# Patient Record
Sex: FEMALE | URBAN - METROPOLITAN AREA
[De-identification: names, ages, dates, MRNs, and addresses within clinical notes are randomized per-mention and may not be internally consistent; named-entity substitution may affect disease eponyms.]

---

## 2023-02-23 LAB
ABO (EXTERNAL): NORMAL
RH (EXTERNAL): POSITIVE

## 2023-11-10 LAB
HEPATITIS B SURFACE ANTIGEN (EXTERNAL): NEGATIVE
HIV1+2 AB SERPL QL IA: NEGATIVE
RUBELLA ANTIBODY IGG (EXTERNAL): NORMAL
TREPONEMA PALLIDUM ANTIBODY (EXTERNAL): NONREACTIVE

## 2024-04-26 LAB — GROUP B STREPTOCOCCUS (EXTERNAL): NEGATIVE

## 2024-05-16 ENCOUNTER — HOSPITAL ENCOUNTER (INPATIENT)
Facility: CLINIC | Age: 34
LOS: 1 days | Discharge: HOME HEALTH | End: 2024-05-17
Attending: OBSTETRICS & GYNECOLOGY | Admitting: OBSTETRICS & GYNECOLOGY
Payer: COMMERCIAL

## 2024-05-16 PROBLEM — Z36.89 ENCOUNTER FOR TRIAGE IN PREGNANT PATIENT: Status: ACTIVE | Noted: 2024-05-16

## 2024-05-16 LAB
ABO/RH(D): NORMAL
ANTIBODY SCREEN: NEGATIVE
HGB BLD-MCNC: 12.2 G/DL (ref 11.7–15.7)
SPECIMEN EXPIRATION DATE: NORMAL
T PALLIDUM AB SER QL: NONREACTIVE

## 2024-05-16 PROCEDURE — 250N000013 HC RX MED GY IP 250 OP 250 PS 637: Performed by: OBSTETRICS & GYNECOLOGY

## 2024-05-16 PROCEDURE — 250N000009 HC RX 250: Performed by: OBSTETRICS & GYNECOLOGY

## 2024-05-16 PROCEDURE — 86780 TREPONEMA PALLIDUM: CPT | Performed by: OBSTETRICS & GYNECOLOGY

## 2024-05-16 PROCEDURE — 120N000012 HC R&B POSTPARTUM

## 2024-05-16 PROCEDURE — 722N000001 HC LABOR CARE VAGINAL DELIVERY SINGLE

## 2024-05-16 PROCEDURE — 85018 HEMOGLOBIN: CPT | Performed by: OBSTETRICS & GYNECOLOGY

## 2024-05-16 PROCEDURE — G0463 HOSPITAL OUTPT CLINIC VISIT: HCPCS

## 2024-05-16 PROCEDURE — 250N000011 HC RX IP 250 OP 636: Performed by: OBSTETRICS & GYNECOLOGY

## 2024-05-16 PROCEDURE — 86900 BLOOD TYPING SEROLOGIC ABO: CPT | Performed by: OBSTETRICS & GYNECOLOGY

## 2024-05-16 RX ORDER — OXYTOCIN/0.9 % SODIUM CHLORIDE 30/500 ML
340 PLASTIC BAG, INJECTION (ML) INTRAVENOUS CONTINUOUS PRN
Status: DISCONTINUED | OUTPATIENT
Start: 2024-05-16 | End: 2024-05-17 | Stop reason: HOSPADM

## 2024-05-16 RX ORDER — DOCUSATE SODIUM 100 MG/1
100 CAPSULE, LIQUID FILLED ORAL 2 TIMES DAILY
Status: DISCONTINUED | OUTPATIENT
Start: 2024-05-16 | End: 2024-05-17 | Stop reason: HOSPADM

## 2024-05-16 RX ORDER — METHYLERGONOVINE MALEATE 0.2 MG/ML
200 INJECTION INTRAVENOUS
Status: DISCONTINUED | OUTPATIENT
Start: 2024-05-16 | End: 2024-05-17 | Stop reason: HOSPADM

## 2024-05-16 RX ORDER — OXYTOCIN 10 [USP'U]/ML
10 INJECTION, SOLUTION INTRAMUSCULAR; INTRAVENOUS
Status: DISCONTINUED | OUTPATIENT
Start: 2024-05-16 | End: 2024-05-16

## 2024-05-16 RX ORDER — IBUPROFEN 400 MG/1
800 TABLET, FILM COATED ORAL
Status: COMPLETED | OUTPATIENT
Start: 2024-05-16 | End: 2024-05-16

## 2024-05-16 RX ORDER — MISOPROSTOL 200 UG/1
400 TABLET ORAL
Status: DISCONTINUED | OUTPATIENT
Start: 2024-05-16 | End: 2024-05-17 | Stop reason: HOSPADM

## 2024-05-16 RX ORDER — ONDANSETRON 4 MG/1
4 TABLET, ORALLY DISINTEGRATING ORAL EVERY 6 HOURS PRN
Status: DISCONTINUED | OUTPATIENT
Start: 2024-05-16 | End: 2024-05-16 | Stop reason: HOSPADM

## 2024-05-16 RX ORDER — TRANEXAMIC ACID 10 MG/ML
1 INJECTION, SOLUTION INTRAVENOUS EVERY 30 MIN PRN
Status: DISCONTINUED | OUTPATIENT
Start: 2024-05-16 | End: 2024-05-17 | Stop reason: HOSPADM

## 2024-05-16 RX ORDER — MISOPROSTOL 200 UG/1
400 TABLET ORAL
Status: DISCONTINUED | OUTPATIENT
Start: 2024-05-16 | End: 2024-05-16 | Stop reason: HOSPADM

## 2024-05-16 RX ORDER — OXYTOCIN 10 [USP'U]/ML
10 INJECTION, SOLUTION INTRAMUSCULAR; INTRAVENOUS
Status: DISCONTINUED | OUTPATIENT
Start: 2024-05-16 | End: 2024-05-17 | Stop reason: HOSPADM

## 2024-05-16 RX ORDER — OXYTOCIN/0.9 % SODIUM CHLORIDE 30/500 ML
100-340 PLASTIC BAG, INJECTION (ML) INTRAVENOUS CONTINUOUS PRN
Status: DISCONTINUED | OUTPATIENT
Start: 2024-05-16 | End: 2024-05-16

## 2024-05-16 RX ORDER — NALOXONE HYDROCHLORIDE 0.4 MG/ML
0.2 INJECTION, SOLUTION INTRAMUSCULAR; INTRAVENOUS; SUBCUTANEOUS
Status: DISCONTINUED | OUTPATIENT
Start: 2024-05-16 | End: 2024-05-16 | Stop reason: HOSPADM

## 2024-05-16 RX ORDER — OXYTOCIN/0.9 % SODIUM CHLORIDE 30/500 ML
340 PLASTIC BAG, INJECTION (ML) INTRAVENOUS CONTINUOUS PRN
Status: DISCONTINUED | OUTPATIENT
Start: 2024-05-16 | End: 2024-05-16 | Stop reason: HOSPADM

## 2024-05-16 RX ORDER — LOPERAMIDE HCL 2 MG
2 CAPSULE ORAL
Status: DISCONTINUED | OUTPATIENT
Start: 2024-05-16 | End: 2024-05-17 | Stop reason: HOSPADM

## 2024-05-16 RX ORDER — MISOPROSTOL 200 UG/1
800 TABLET ORAL
Status: DISCONTINUED | OUTPATIENT
Start: 2024-05-16 | End: 2024-05-17 | Stop reason: HOSPADM

## 2024-05-16 RX ORDER — METOCLOPRAMIDE HYDROCHLORIDE 5 MG/ML
10 INJECTION INTRAMUSCULAR; INTRAVENOUS EVERY 6 HOURS PRN
Status: DISCONTINUED | OUTPATIENT
Start: 2024-05-16 | End: 2024-05-16 | Stop reason: HOSPADM

## 2024-05-16 RX ORDER — CARBOPROST TROMETHAMINE 250 UG/ML
250 INJECTION, SOLUTION INTRAMUSCULAR
Status: DISCONTINUED | OUTPATIENT
Start: 2024-05-16 | End: 2024-05-16 | Stop reason: HOSPADM

## 2024-05-16 RX ORDER — BISACODYL 10 MG
10 SUPPOSITORY, RECTAL RECTAL DAILY PRN
Status: DISCONTINUED | OUTPATIENT
Start: 2024-05-16 | End: 2024-05-17 | Stop reason: HOSPADM

## 2024-05-16 RX ORDER — METOCLOPRAMIDE 10 MG/1
10 TABLET ORAL EVERY 6 HOURS PRN
Status: DISCONTINUED | OUTPATIENT
Start: 2024-05-16 | End: 2024-05-16 | Stop reason: HOSPADM

## 2024-05-16 RX ORDER — LOPERAMIDE HCL 2 MG
2 CAPSULE ORAL
Status: DISCONTINUED | OUTPATIENT
Start: 2024-05-16 | End: 2024-05-16 | Stop reason: HOSPADM

## 2024-05-16 RX ORDER — KETOROLAC TROMETHAMINE 30 MG/ML
30 INJECTION, SOLUTION INTRAMUSCULAR; INTRAVENOUS
Status: COMPLETED | OUTPATIENT
Start: 2024-05-16 | End: 2024-05-16

## 2024-05-16 RX ORDER — PROCHLORPERAZINE MALEATE 5 MG
10 TABLET ORAL EVERY 6 HOURS PRN
Status: DISCONTINUED | OUTPATIENT
Start: 2024-05-16 | End: 2024-05-16 | Stop reason: HOSPADM

## 2024-05-16 RX ORDER — LIDOCAINE 40 MG/G
CREAM TOPICAL
Status: DISCONTINUED | OUTPATIENT
Start: 2024-05-16 | End: 2024-05-16 | Stop reason: HOSPADM

## 2024-05-16 RX ORDER — LOPERAMIDE HCL 2 MG
4 CAPSULE ORAL
Status: DISCONTINUED | OUTPATIENT
Start: 2024-05-16 | End: 2024-05-17 | Stop reason: HOSPADM

## 2024-05-16 RX ORDER — MODIFIED LANOLIN
OINTMENT (GRAM) TOPICAL
Status: DISCONTINUED | OUTPATIENT
Start: 2024-05-16 | End: 2024-05-17 | Stop reason: HOSPADM

## 2024-05-16 RX ORDER — HYDROCORTISONE 25 MG/G
CREAM TOPICAL 3 TIMES DAILY PRN
Status: DISCONTINUED | OUTPATIENT
Start: 2024-05-16 | End: 2024-05-17 | Stop reason: HOSPADM

## 2024-05-16 RX ORDER — PROCHLORPERAZINE 25 MG
25 SUPPOSITORY, RECTAL RECTAL EVERY 12 HOURS PRN
Status: DISCONTINUED | OUTPATIENT
Start: 2024-05-16 | End: 2024-05-16 | Stop reason: HOSPADM

## 2024-05-16 RX ORDER — MISOPROSTOL 200 UG/1
800 TABLET ORAL
Status: DISCONTINUED | OUTPATIENT
Start: 2024-05-16 | End: 2024-05-16 | Stop reason: HOSPADM

## 2024-05-16 RX ORDER — CITRIC ACID/SODIUM CITRATE 334-500MG
30 SOLUTION, ORAL ORAL
Status: DISCONTINUED | OUTPATIENT
Start: 2024-05-16 | End: 2024-05-16 | Stop reason: HOSPADM

## 2024-05-16 RX ORDER — NALOXONE HYDROCHLORIDE 0.4 MG/ML
0.4 INJECTION, SOLUTION INTRAMUSCULAR; INTRAVENOUS; SUBCUTANEOUS
Status: DISCONTINUED | OUTPATIENT
Start: 2024-05-16 | End: 2024-05-16 | Stop reason: HOSPADM

## 2024-05-16 RX ORDER — FENTANYL CITRATE 50 UG/ML
100 INJECTION, SOLUTION INTRAMUSCULAR; INTRAVENOUS
Status: DISCONTINUED | OUTPATIENT
Start: 2024-05-16 | End: 2024-05-16 | Stop reason: HOSPADM

## 2024-05-16 RX ORDER — IBUPROFEN 400 MG/1
800 TABLET, FILM COATED ORAL EVERY 6 HOURS PRN
Status: DISCONTINUED | OUTPATIENT
Start: 2024-05-16 | End: 2024-05-17 | Stop reason: HOSPADM

## 2024-05-16 RX ORDER — TRANEXAMIC ACID 10 MG/ML
1 INJECTION, SOLUTION INTRAVENOUS EVERY 30 MIN PRN
Status: DISCONTINUED | OUTPATIENT
Start: 2024-05-16 | End: 2024-05-16 | Stop reason: HOSPADM

## 2024-05-16 RX ORDER — METHYLERGONOVINE MALEATE 0.2 MG/ML
200 INJECTION INTRAVENOUS
Status: DISCONTINUED | OUTPATIENT
Start: 2024-05-16 | End: 2024-05-16 | Stop reason: HOSPADM

## 2024-05-16 RX ORDER — CARBOPROST TROMETHAMINE 250 UG/ML
250 INJECTION, SOLUTION INTRAMUSCULAR
Status: DISCONTINUED | OUTPATIENT
Start: 2024-05-16 | End: 2024-05-17 | Stop reason: HOSPADM

## 2024-05-16 RX ORDER — ONDANSETRON 2 MG/ML
4 INJECTION INTRAMUSCULAR; INTRAVENOUS EVERY 6 HOURS PRN
Status: DISCONTINUED | OUTPATIENT
Start: 2024-05-16 | End: 2024-05-16 | Stop reason: HOSPADM

## 2024-05-16 RX ORDER — FERROUS SULFATE 325(65) MG
325 TABLET ORAL
COMMUNITY

## 2024-05-16 RX ORDER — ASCORBIC ACID 100 MG
1 TABLET,CHEWABLE ORAL DAILY
COMMUNITY

## 2024-05-16 RX ORDER — OXYTOCIN 10 [USP'U]/ML
10 INJECTION, SOLUTION INTRAMUSCULAR; INTRAVENOUS
Status: DISCONTINUED | OUTPATIENT
Start: 2024-05-16 | End: 2024-05-16 | Stop reason: HOSPADM

## 2024-05-16 RX ORDER — ACETAMINOPHEN 325 MG/1
650 TABLET ORAL EVERY 4 HOURS PRN
Status: DISCONTINUED | OUTPATIENT
Start: 2024-05-16 | End: 2024-05-17 | Stop reason: HOSPADM

## 2024-05-16 RX ORDER — LOPERAMIDE HCL 2 MG
4 CAPSULE ORAL
Status: DISCONTINUED | OUTPATIENT
Start: 2024-05-16 | End: 2024-05-16 | Stop reason: HOSPADM

## 2024-05-16 RX ADMIN — METHYLERGONOVINE MALEATE 200 MCG: 0.2 INJECTION INTRAVENOUS at 04:34

## 2024-05-16 RX ADMIN — IBUPROFEN 800 MG: 400 TABLET ORAL at 08:50

## 2024-05-16 RX ADMIN — DOCUSATE SODIUM 100 MG: 100 CAPSULE, LIQUID FILLED ORAL at 20:50

## 2024-05-16 RX ADMIN — Medication 340 ML/HR: at 02:14

## 2024-05-16 RX ADMIN — IBUPROFEN 800 MG: 400 TABLET ORAL at 02:39

## 2024-05-16 RX ADMIN — ACETAMINOPHEN 650 MG: 325 TABLET, FILM COATED ORAL at 02:39

## 2024-05-16 ASSESSMENT — ACTIVITIES OF DAILY LIVING (ADL)
ADLS_ACUITY_SCORE: 18
ADLS_ACUITY_SCORE: 18
ADLS_ACUITY_SCORE: 23
ADLS_ACUITY_SCORE: 18
ADLS_ACUITY_SCORE: 22
ADLS_ACUITY_SCORE: 18
ADLS_ACUITY_SCORE: 22
ADLS_ACUITY_SCORE: 23
ADLS_ACUITY_SCORE: 22
ADLS_ACUITY_SCORE: 18
ADLS_ACUITY_SCORE: 22
ADLS_ACUITY_SCORE: 23
ADLS_ACUITY_SCORE: 22
ADLS_ACUITY_SCORE: 22
ADLS_ACUITY_SCORE: 18

## 2024-05-16 NOTE — PROGRESS NOTES
Noa is a  39+3 presenting with  Ian for evaluation of contractions. She reports contractions starting around 2300, increasing in intensity and frequency. She denies LOF and VB, endorses +FM. She reports an urge to push shortly after admission into triage room, LINDA LOUIE 10/100/-2. Pt transferred via bed to room 218, report to Yanet PARK.

## 2024-05-16 NOTE — PROGRESS NOTES
[facility]   Obstetrics Progress Note    Subjective:   This is the patient's day 0 day since Vaginal delivery. She is doing well.  She is ambulating and urinating on her own. Pain is well controlled with medication. Breastfeeding is going well. Lochia is within normal limits.     Objective:   Vitals were reviewed  Temp: 98  F (36.7  C) Temp src: Oral BP: 109/72 Pulse: 76   Resp: 18   O2 Device: None (Room air)      Constitutional: healthy, alert, no distress.   Abdomen: Abdomen soft, non-tender. BS normal. No masses, fundus is firm.  JOINT/EXTREMITIES: extremities normal     Last hemoglobin was   Hemoglobin   Date Value Ref Range Status   05/16/2024 12.2 11.7 - 15.7 g/dL Final   ]    Assessment:   Stable postpartum course.    Plan:   Routine cares. Ambulation encouraged  Monitor wound for signs of infection  Pain control measures as needed  Anticipate discharge tomorrow  Pain control: ibuprofen and acetaminophen PRN  Diet as tolerated  Activity as tolerated  Dispo: anticipate discharge tomorrow.       JENNY Carcamo CNP

## 2024-05-16 NOTE — PLAN OF CARE
Data: Noa Doyle transferred to [facility] via wheelchair at 0530. Baby transferred via parent's arms.  Action: Receiving unit notified of transfer: Yes. Patient and family notified of room change. Report given to Violet Morgan RN at 0540. Belongings sent to receiving unit. Accompanied by Registered Nurse. Oriented patient to surroundings. Call light within reach. ID bands double-checked with receiving RN.  Response: Patient tolerated transfer and is stable.Goal      Fundus firm, U/1, with scant rubra at time of transfer.

## 2024-05-16 NOTE — H&P
Labor and Delivery Admission Note  Chief Complaint:   Chief Complaint   Patient presents with    Contractions      History of Present Illness: Noa Doyle is a 33 year old  at 39w3d who presents with painful contractions.    Vital Signs: /81   Temp 97.2  F (36.2  C) (Temporal)   Resp 20    Physical Exam: General: NAD, mood appropriate  Cardiovascular: Regular rate  Pulmonary: Non-labored  Abdomen: Gravid, non-tender  Extremities: Warm and well perfused  Fetal Presentation: Vertex  Cervical Exam:  complete and intact  Fetal Assessment: moderate variablility, + accels, no decels, Category I    Assessment and Plan:  Noa Doyle is a 33 year old  being admitted to L&D for SOL  - AROM and anticipate     JANEL MELGAR MD  2024, 2:22 AM

## 2024-05-16 NOTE — PROVIDER NOTIFICATION
05/16/24 0427   Provider Notification   Provider Name/Title Dr Schmidt   Method of Notification Electronic Page     Please call Yanet RN at [phone] regarding Noa A Room 218. Thank you

## 2024-05-16 NOTE — PROGRESS NOTES
Patient, spouse, and  transferred to room 427 accompanied by Yanet Pool RN. Bedside report received at this time. ID bands double verified. Patient and spouse oriented to room, call light, and plan of care. Safety protocols including safe sleep and bulb syringe use reviewed. Encouraged to call with questions/concerns.

## 2024-05-16 NOTE — PROGRESS NOTES
VSS. Breast feeding infant. Pain well controlled with tylenol and ibuprofen. Up ad nova. Voiding without difficulty. PIV removed. Fundus firm, scant rubra flow. All questions and concerns addressed.

## 2024-05-16 NOTE — L&D DELIVERY NOTE
Delivery Note  Diagnosis: Intrauterine pregnancy at 39w3d, presents complete after being diverted from FVR  Procedure: Vaginal delivery  Findings: Liveborn male infant, Apgars were  pending  at 1 and 5 minutes respectively. Infant weight not yet recorded due to skin to skin.  Anesthesia: none   QBL:  EBL 150ml    Noa Doyle is a 33 year old  female at 39w3d weeks gestation. Her pregnancy was uncomplicated. She presented with painful contractions.. NST was reactive upon admission. Her labor did not require augmentation.  Patient progressed to complete and delivered a viable infant without complication. The infant was placed on the patient's abdomen. Cord clamping was delayed by 90 seconds, at which time the cord was doubly clamped and cut. The third stage was actively managed with external uterine massage, gentle cord traction and pitocin. The placenta delivered spontaneously and intact. First degree laceration noted that was not repaired. Excellent hemostasis was noted. All counts were correct before and after the delivery.     JANEL MELGAR MD

## 2024-05-16 NOTE — PLAN OF CARE
Viable male delivered vaginally with nuchal x1. RN and provider evaluating FHR continuously during pushing efforts.

## 2024-05-17 VITALS
SYSTOLIC BLOOD PRESSURE: 128 MMHG | WEIGHT: 147.3 LBS | HEART RATE: 70 BPM | RESPIRATION RATE: 16 BRPM | TEMPERATURE: 97.3 F | DIASTOLIC BLOOD PRESSURE: 83 MMHG

## 2024-05-17 PROCEDURE — 250N000013 HC RX MED GY IP 250 OP 250 PS 637: Performed by: OBSTETRICS & GYNECOLOGY

## 2024-05-17 RX ADMIN — DOCUSATE SODIUM 100 MG: 100 CAPSULE, LIQUID FILLED ORAL at 07:50

## 2024-05-17 RX ADMIN — IBUPROFEN 800 MG: 400 TABLET ORAL at 06:42

## 2024-05-17 RX ADMIN — ACETAMINOPHEN 650 MG: 325 TABLET, FILM COATED ORAL at 07:49

## 2024-05-17 ASSESSMENT — ACTIVITIES OF DAILY LIVING (ADL)
ADLS_ACUITY_SCORE: 18
ADLS_ACUITY_SCORE: 22
ADLS_ACUITY_SCORE: 18

## 2024-05-17 NOTE — PLAN OF CARE
Vital signs stable. Postpartum assessment WDL. Pain controlled with tyl and ibu. Patient voiding without difficulty. Breastfeeding on cue with no assist. Patient and infant bonding well. Discharge instructions received, questions answered. All belongings with pt. Wheeled down to where partner took pt home.

## 2024-05-17 NOTE — PLAN OF CARE
Goal Outcome Evaluation:    VSS. Voiding adequately. Denies pain. Up independently in room. Breastfeeding infant well with minimal assist. Meeting expected outcomes.

## 2024-05-17 NOTE — PLAN OF CARE
Goal Outcome Evaluation:      Plan of Care Reviewed With: patient    Overall Patient Progress: improvingOverall Patient Progress: improving       Vital signs stable. Postpartum assessment WDL. Pain controlled with Ibuprofen as needed. Patient voiding without difficulty. Breastfeeding on cue with no assist. Patient and infant bonding well. Will continue with current plan of care.

## 2024-05-17 NOTE — PLAN OF CARE
Goal Outcome Evaluation:  VSS. Postpartum assessment WDL Patient able to empty bladder independently and is up ambulating. Patient performing self cares and is able to care for infant. Patient got PRN Ibuprofen for pain. Call bell with in reach.

## 2024-05-17 NOTE — DISCHARGE INSTRUCTIONS
Warning Signs after Having a Baby    Keep this paper on your fridge or somewhere else where you can see it.    Call your provider if you have any of these symptoms up to 12 weeks after having your baby.    Thoughts of hurting yourself or your baby  Pain in your chest or trouble breathing  Severe headache not helped by pain medicine  Eyesight concerns (blurry vision, seeing spots or flashes of light, other changes to eyesight)  Fainting, shaking or other signs of a seizure    Call 9-1-1 if you feel that it is an emergency.     The symptoms below can happen to anyone after giving birth. They can be very serious. Call your provider if you have any of these warning signs.    My provider s phone number: _______________________    Losing too much blood (hemorrhage)    Call your provider if you soak through a pad in less than an hour or pass blood clots bigger than a golf ball. These may be signs that you are bleeding too much.    Blood clots in the legs or lungs    After you give birth, your body naturally clots its blood to help prevent blood loss. Sometimes this increased clotting can happen in other areas of the body, like the legs or lungs. This can block your blood flow and be very dangerous.     Call your provider if you:  Have a red, swollen spot on the back of your leg that is warm or painful when you touch it.   Are coughing up blood.     Infection    Call your provider if you have any of these symptoms:  Fever of 100.4 F (38 C) or higher.  Pain or redness around your stitches if you had an incision.   Any yellow, white, or green fluid coming from places where you had stitches or surgery.    Mood Problems (postpartum depression)    Many people feel sad or have mood changes after having a baby. But for some people, these mood swings are worse.     Call your provider right away if you feel so anxious or nervous that you can't care for yourself or your baby.    Preeclampsia (high blood pressure)    Even if you  didn't have high blood pressure when you were pregnant, you are at risk for the high blood pressure disease called preeclampsia. This risk can last up to 12 weeks after giving birth.     Call your provider if you have:   Pain on your right side under your rib cage  Sudden swelling in the hands and face    Remember: You know your body. If something doesn't feel right, get medical help.     For informational purposes only. Not to replace the advice of your health care provider. Copyright 2020 [facility]. All rights reserved. Clinically reviewed by Yessi Bauer, RNC-OB, MSN. [employer] 821252 - Rev 02/23.

## 2024-05-17 NOTE — LACTATION NOTE
This note was copied from a baby's chart.  Initial and discharge visit with Mother and baby boy.  Mother states breast feeding is going well.  This is her 3rd breast feeding experience.  She denies having any issues or problems with her previous breast feeding experiences.  She says baby latches on well and feeds frequently.    Mother educated on normal  behavior, focusing on normal feeding patterns from birth to day 3 of life. Reviewed early milk volumes and how to know baby is getting enough by recording feedings and wet/dirty diapers.  Breast feeding general information reviewed.    Appreciative of visit.  No further questions at this time.   Reviewed follow up with outpatient lactation consultant in pediatrician clinic as needed.    Phyllis Mitchell RN, IBCLC

## (undated) RX ORDER — OXYTOCIN/0.9 % SODIUM CHLORIDE 30/500 ML
PLASTIC BAG, INJECTION (ML) INTRAVENOUS
Status: DISPENSED
Start: 2024-05-16